# Patient Record
Sex: FEMALE | Race: BLACK OR AFRICAN AMERICAN
[De-identification: names, ages, dates, MRNs, and addresses within clinical notes are randomized per-mention and may not be internally consistent; named-entity substitution may affect disease eponyms.]

---

## 2021-05-17 PROBLEM — Z00.00 ENCOUNTER FOR PREVENTIVE HEALTH EXAMINATION: Status: ACTIVE | Noted: 2021-05-17

## 2021-05-19 ENCOUNTER — APPOINTMENT (OUTPATIENT)
Dept: PEDIATRIC ALLERGY IMMUNOLOGY | Facility: CLINIC | Age: 26
End: 2021-05-19
Payer: COMMERCIAL

## 2021-05-19 VITALS
WEIGHT: 156 LBS | DIASTOLIC BLOOD PRESSURE: 62 MMHG | SYSTOLIC BLOOD PRESSURE: 110 MMHG | HEIGHT: 64 IN | BODY MASS INDEX: 26.63 KG/M2 | TEMPERATURE: 98 F

## 2021-05-19 PROCEDURE — 99203 OFFICE O/P NEW LOW 30 MIN: CPT

## 2021-05-19 RX ORDER — AZELASTINE HYDROCHLORIDE 137 UG/1
0.1 SPRAY, METERED NASAL
Qty: 1 | Refills: 2 | Status: ACTIVE | COMMUNITY
Start: 2021-05-19 | End: 1900-01-01

## 2021-05-19 RX ORDER — FLUTICASONE PROPIONATE 50 UG/1
50 SPRAY, METERED NASAL DAILY
Qty: 1 | Refills: 3 | Status: ACTIVE | COMMUNITY
Start: 2021-05-19 | End: 1900-01-01

## 2021-05-19 NOTE — HISTORY OF PRESENT ILLNESS
[Eyes] : eyes [Mouth] : mouth [Nose] : nose [Skin] : skin [de-identified] : PENNIE ENRIQUE is a 25 year yo female w/ AR/AC.Itchy and runny nose. She used nasonex and it wasn't as helpful. She says that her nose runse 24/7  [de-identified] : for over 3 years  [de-identified] : environmental allergies [de-identified] : allegra doesn’t help  [de-identified] : all year around and every day  [de-identified] : nasal congestion, runny nose, itchy eyes, rashes under arms and hands

## 2021-05-19 NOTE — REASON FOR VISIT
[Initial Evaluation] : an initial evaluation of [Congestion] : congestion [Runny Nose] : runny nose [Itchy Eyes] : itchy eyes [Other: _____] : [unfilled] [FreeTextEntry3] : pt was a previous patient. pt states she would like to get re tested for environmental allergies, she is also considering Immunotherapy since allergy medications hasn’t been helping her.

## 2021-05-19 NOTE — SOCIAL HISTORY
[House] : [unfilled] lives in a house  [Central Forced Air] : heating provided by central forced air [Central] : air conditioning provided by central unit [Dry] : dry [Feather Pillows] : has feather pillows [Bedroom] :  in bedroom [Living Area] : in living area [None] : none [Previous Pets ___] : previously owned pets [unfilled] [Single] : single [Dust Mite Covers] : does not have dust mite covers [Feather Comforter] : does not have a feather comforter [Basement] : not in the basement [Smokers in Household] : there are no smokers in the home [de-identified] : deodorant

## 2021-05-19 NOTE — PHYSICAL EXAM
[Alert] : alert [Well Nourished] : well nourished [Healthy Appearance] : healthy appearance [No Acute Distress] : no acute distress [Well Developed] : well developed [Normal Pupil & Iris Size/Symmetry] : normal pupil and iris size and symmetry [No Discharge] : no discharge [No Photophobia] : no photophobia [Sclera Not Icteric] : sclera not icteric [Normal TMs] : both tympanic membranes were normal [Normal Nasal Mucosa] : the nasal mucosa was normal [Normal Lips/Tongue] : the lips and tongue were normal [Normal Outer Ear/Nose] : the ears and nose were normal in appearance [Normal Tonsils] : normal tonsils [No Thrush] : no thrush [Pale mucosa] : no pale mucosa [Boggy Nasal Turbinates] : boggy and/or pale nasal turbinates [Supple] : the neck was supple [Normal Rate and Effort] : normal respiratory rhythm and effort [No Crackles] : no crackles [No Retractions] : no retractions [Bilateral Audible Breath Sounds] : bilateral audible breath sounds [Normal Rate] : heart rate was normal  [Normal S1, S2] : normal S1 and S2 [No murmur] : no murmur [Regular Rhythm] : with a regular rhythm [Soft] : abdomen soft [Not Tender] : non-tender [Not Distended] : not distended [No HSM] : no hepato-splenomegaly [Normal Cervical Lymph Nodes] : cervical [Skin Intact] : skin intact  [No Rash] : no rash [No Skin Lesions] : no skin lesions [No clubbing] : no clubbing [No Edema] : no edema [No Cyanosis] : no cyanosis [Normal Mood] : mood was normal [Normal Affect] : affect was normal [Alert, Awake, Oriented as Age-Appropriate] : alert, awake, oriented as age appropriate

## 2021-05-19 NOTE — REVIEW OF SYSTEMS
[Eye Discharge] : eye discharge [Eye Itching] : itchy eyes [Rhinorrhea] : rhinorrhea [Nasal Congestion] : nasal congestion [Throat Itching] : throat itching [Sneezing] : sneezing [Nl] : Genitourinary [Immunizations are up to date] : Immunizations are up to date [Received Influenza Vaccine this Past Year] : patient has received the Influenza vaccine this past year

## 2021-06-02 ENCOUNTER — APPOINTMENT (OUTPATIENT)
Dept: PEDIATRIC ALLERGY IMMUNOLOGY | Facility: CLINIC | Age: 26
End: 2021-06-02
Payer: COMMERCIAL

## 2021-06-02 VITALS
SYSTOLIC BLOOD PRESSURE: 112 MMHG | TEMPERATURE: 97.9 F | HEIGHT: 64 IN | BODY MASS INDEX: 26.46 KG/M2 | DIASTOLIC BLOOD PRESSURE: 60 MMHG | WEIGHT: 155 LBS

## 2021-06-02 PROCEDURE — 99213 OFFICE O/P EST LOW 20 MIN: CPT

## 2021-06-02 RX ORDER — IPRATROPIUM BROMIDE 21 UG/1
0.03 SPRAY NASAL 3 TIMES DAILY
Qty: 1 | Refills: 5 | Status: ACTIVE | COMMUNITY
Start: 2021-06-02 | End: 1900-01-01

## 2021-06-02 NOTE — HISTORY OF PRESENT ILLNESS
[Eyes] : eyes [Mouth] : mouth [Nose] : nose [Skin] : skin [de-identified] : PENNIE ENRIQUE is a 25 year yo female w/ AR/AC.Itchy and runny nose. She used nasonex and it wasn't as helpful. She says that her nose runse 24/7.\par \par She notes the new medicine did not help her that much. [de-identified] : for over 3 years  [de-identified] : environmental allergies [de-identified] : allegra doesn’t help  [de-identified] : all year around and every day  [de-identified] : nasal congestion, runny nose, itchy eyes, rashes under arms and hands

## 2021-06-02 NOTE — REVIEW OF SYSTEMS
[Rhinorrhea] : rhinorrhea [Nasal Dryness] : dryness of the nose [Nasal Congestion] : nasal congestion [Nl] : Genitourinary

## 2021-06-02 NOTE — REASON FOR VISIT
[Routine Follow-Up] : a routine follow-up visit for [Congestion] : congestion [Other: _____] : [unfilled] [FreeTextEntry3] : pt states she is still congested and runny, it got a little better but medications didn’t help much

## 2021-06-23 ENCOUNTER — APPOINTMENT (OUTPATIENT)
Dept: PEDIATRIC ALLERGY IMMUNOLOGY | Facility: CLINIC | Age: 26
End: 2021-06-23
Payer: COMMERCIAL

## 2021-06-23 VITALS
DIASTOLIC BLOOD PRESSURE: 72 MMHG | TEMPERATURE: 98 F | WEIGHT: 155 LBS | SYSTOLIC BLOOD PRESSURE: 116 MMHG | HEIGHT: 64 IN | BODY MASS INDEX: 26.46 KG/M2

## 2021-06-23 DIAGNOSIS — J30.9 ALLERGIC RHINITIS, UNSPECIFIED: ICD-10-CM

## 2021-06-23 DIAGNOSIS — H10.10 ACUTE ATOPIC CONJUNCTIVITIS, UNSPECIFIED EYE: ICD-10-CM

## 2021-06-23 PROCEDURE — 99213 OFFICE O/P EST LOW 20 MIN: CPT

## 2021-06-23 RX ORDER — AZELASTINE HYDROCHLORIDE 137 UG/1
0.1 SPRAY, METERED NASAL TWICE DAILY
Qty: 1 | Refills: 2 | Status: ACTIVE | COMMUNITY
Start: 2021-06-23 | End: 1900-01-01

## 2021-06-23 RX ORDER — CETIRIZINE HYDROCHLORIDE 10 MG/1
10 TABLET, COATED ORAL DAILY
Qty: 30 | Refills: 2 | Status: ACTIVE | COMMUNITY
Start: 2021-06-23 | End: 1900-01-01

## 2021-06-23 RX ORDER — FLUTICASONE PROPIONATE 50 UG/1
50 SPRAY, METERED NASAL DAILY
Qty: 1 | Refills: 2 | Status: ACTIVE | COMMUNITY
Start: 2021-06-23 | End: 1900-01-01

## 2021-06-23 RX ORDER — MONTELUKAST 10 MG/1
10 TABLET, FILM COATED ORAL
Qty: 30 | Refills: 2 | Status: ACTIVE | COMMUNITY
Start: 2021-06-23 | End: 1900-01-01

## 2021-06-23 NOTE — HISTORY OF PRESENT ILLNESS
[Eyes] : eyes [Mouth] : mouth [Nose] : nose [Skin] : skin [de-identified] : PENNIE ENRIQUE is a 25 year yo female w/ AR/AC.Itchy and runny nose. She used nasonex and it wasn't as helpful. She says that her nose runse 24/7.\par \par She notes the new medicine did not help her that much. She felt that the fluticasone and azelastine nasal [de-identified] : for over 3 years  [de-identified] : environmental allergies [de-identified] : allegra doesn’t help  [de-identified] : all year around and every day  [de-identified] : nasal congestion, runny nose, itchy eyes, rashes under arms and hands

## 2021-06-23 NOTE — REASON FOR VISIT
[Routine Follow-Up] : a routine follow-up visit for [Congestion] : congestion [Other: _____] : [unfilled] [FreeTextEntry3] : pt congestion and runny nose still hasn’t been resolved. Pt stated fluticasone and Azelastine nose spray helped better than ipratropium nasal spray.

## 2021-06-23 NOTE — REVIEW OF SYSTEMS
[Rhinorrhea] : rhinorrhea [Nasal Congestion] : nasal congestion [Sneezing] : sneezing [Nl] : Genitourinary

## 2021-06-23 NOTE — ASSESSMENT
[FreeTextEntry1] : 1. AR/AC - Fluticasone nasal and azelastine nasal. - cetirizine 10mg. Montekulast 10mg.